# Patient Record
(demographics unavailable — no encounter records)

---

## 2024-11-11 NOTE — REASON FOR VISIT
[FreeTextEntry1] : The patient seen for follow-up of tobacco addiction as well as hypertension.  The patient states checks her blood pressure at home.  On occasion she has systolic blood pressures exceeding 140.  Overall she does not wish to increase pharmacologic therapy at this time.  She has been compliant with amlodipine 10 mg daily.  The patient continues to drink but only 1-2 drinks per day.  She has had elevated transaminases in the past.

## 2024-11-11 NOTE — ASSESSMENT
[FreeTextEntry1] : Tobacco dependence: The patient states she is been helped by nicotine patches in the past.  She is requesting a prescription for nicotine patch.  Prescription for nicotine patch has been issued.  Hyperlipidemia: Given her elevated transaminases in the past and alcohol use we have elected to repeat fasting LFTs cholesterol profile and CPK and then make further decisions with regards to hyperlipidemia treatment.  Hypertension: The patient does not wish to uptitrate pharmacologic therapy this time but will consider it in the future.

## 2025-01-22 NOTE — REASON FOR VISIT
[FreeTextEntry1] : The patient is complaining of palpitations.  The patient was having palpitations for quite some time.  The palpitations were occurring almost every day for a while.  Now they are slightly better.  She is uncertain as to how many times per week they are happening at this time.  There is been no syncope.  There has been no near syncope.  The patient underwent phlebotomy for cholesterol and LFTs but it is not available at this time.  It was ordered by her primary care doctor.

## 2025-01-22 NOTE — ASSESSMENT
[FreeTextEntry1] : Palpitations: 14-day event monitoring has been arranged.  Hyperlipidemia: Repeat LFTs and cholesterol profile are pending.  Patient continues to smoke although she cut back.  The patient continues to drink about 1 drink of alcohol per day.  I have recommended cessation of alcohol and cigarettes.  Elevated transaminases: Repeat LFTs are pending.

## 2025-03-11 NOTE — HISTORY OF PRESENT ILLNESS
[FreeTextEntry1] : Patient is a 78 year old female with PMH of HTN, HLD, palpitations, history of atrial mass s/p resection current tobacco use who presents for follow up. Since her last visit patient has been feeling well. She has started her metoprolol 25mg qD and noticed a significant reduction in her palpitations.  She reports compliance with her other medications and has been adhering to healthier diet/mediterranean diet.  She continues to smoke cigarettes but is willing to quit and has nicotine patches at home.  At our visit, patient picked a "quit date" of 4/1/2025 to stop smoking.  Patient denies significant EtOH use but does admit to drinking 1-2 glasses of wine on occasion.     Prior studies: Labs: 12/9/2025: Lipid Panel: , HDL 78, , TG 74; AST/ALT 55/17; K 4.4, BN/Cr 21/0.84; H/H 14.8/44.6, PLTS 187, TSH 1.71. Holter 1/22/25-2/5/2025: SR, avg HR 82 bpm, 2.3% atrial ectopy, 3791 runs of PSVT, longest run approx 5 mins with avg HR 122bpm associated with symptoms TTE 6/17/2024: Normal LV size and systolic function (LVEF 54.6%), mild MAC, trace AR, trace MR , mild TR, trace PI TTE 7/2023: Normal LV size and systolic function, mod MR, mild TR Carotid US 7/3/2023: No hemodynamically significant stenosis, mild to mod R and mod L sided plaque Holter 3/22/23-4/5/23: SR, <1% PVC and APC burden, 3.2 seconds of AT/SVT TTE 10/8/2022: LVEF 50-55% C 9/28/2022 ([facility]): no evidence of CAD

## 2025-03-11 NOTE — PHYSICAL EXAM
[Well Developed] : well developed [Well Nourished] : well nourished [No Acute Distress] : no acute distress [Normal Conjunctiva] : normal conjunctiva [Normal Venous Pressure] : normal venous pressure [No Carotid Bruit] : no carotid bruit [Clear Lung Fields] : clear lung fields [Good Air Entry] : good air entry [No Respiratory Distress] : no respiratory distress  [Soft] : abdomen soft [Non Tender] : non-tender [Normal Gait] : normal gait [No Edema] : no edema [Moves all extremities] : moves all extremities [No Focal Deficits] : no focal deficits [Normal Speech] : normal speech [Alert and Oriented] : alert and oriented [Normal memory] : normal memory [de-identified] : RRR, S1 and S2 appreciated, no mrg

## 2025-03-11 NOTE — HISTORY OF PRESENT ILLNESS
[FreeTextEntry1] : Patient is a 78 year old female with PMH of HTN, HLD, palpitations, history of atrial mass s/p resection current tobacco use who presents for follow up. Since her last visit patient has been feeling well. She has started her metoprolol 25mg qD and noticed a significant reduction in her palpitations.  She reports compliance with her other medications and has been adhering to healthier diet/mediterranean diet.  She continues to smoke cigarettes but is willing to quit and has nicotine patches at home.  At our visit, patient picked a "quit date" of 4/1/2025 to stop smoking.  Patient denies significant EtOH use but does admit to drinking 1-2 glasses of wine on occasion.     Prior studies: Labs: 12/9/2025: Lipid Panel: , HDL 78, , TG 74; AST/ALT 55/17; K 4.4, BN/Cr 21/0.84; H/H 14.8/44.6, PLTS 187, TSH 1.71. Holter 1/22/25-2/5/2025: SR, avg HR 82 bpm, 2.3% atrial ectopy, 3791 runs of PSVT, longest run approx 5 mins with avg HR 122bpm associated with symptoms TTE 6/17/2024: Normal LV size and systolic function (LVEF 54.6%), mild MAC, trace AR, trace MR , mild TR, trace PI TTE 7/2023: Normal LV size and systolic function, mod MR, mild TR Carotid US 7/3/2023: No hemodynamically significant stenosis, mild to mod R and mod L sided plaque Holter 3/22/23-4/5/23: SR, <1% PVC and APC burden, 3.2 seconds of AT/SVT TTE 10/8/2022: LVEF 50-55% C 9/28/2022 ([facility]): no evidence of CAD

## 2025-03-11 NOTE — PHYSICAL EXAM
[Well Developed] : well developed [Well Nourished] : well nourished [No Acute Distress] : no acute distress [Normal Conjunctiva] : normal conjunctiva [Normal Venous Pressure] : normal venous pressure [No Carotid Bruit] : no carotid bruit [Clear Lung Fields] : clear lung fields [Good Air Entry] : good air entry [No Respiratory Distress] : no respiratory distress  [Soft] : abdomen soft [Non Tender] : non-tender [Normal Gait] : normal gait [No Edema] : no edema [Moves all extremities] : moves all extremities [No Focal Deficits] : no focal deficits [Normal Speech] : normal speech [Alert and Oriented] : alert and oriented [Normal memory] : normal memory [de-identified] : RRR, S1 and S2 appreciated, no mrg

## 2025-03-11 NOTE — DISCUSSION/SUMMARY
[FreeTextEntry1] : 1) Palpitations/SVT - 14 day holter monitor with occasional PACs (2.3%) but with 3791 runs of PSVT (longest 5 mins with avg  bpm, symptomatic) without significant ventricular ectopy - Symptoms have greatly improved with metoprolol 25mg qD, will continue  2) HTN Normotensive at current visit - Continue amlodipine 10mg qD - Continue metoprolol as above  3) HLD - Lipid panel from 12/2024: , HDL 78, , TG 74 - Continue dietary/lifestyle modification, recheck CMP and Lipids in 3 months - If repeat LDL is elevated (goal <100), and transaminases are stable or normalized, will consider re-trial of statin on RTC - Continue ASA 81mg, patient taking QoD  4) Tobacco use - Encouraged smoking cessation.  Patient is wanting to quit, has nicotine patches ready - Identified quit date of 4/1/25

## 2025-03-11 NOTE — ASSESSMENT
[FreeTextEntry1] : 78 year old female with PMH of HTN, HLD, palpitations, current tobacco use who presents for follow up. Patient has been doing well since starting metoprolol 25mg qD, reports significant reduction in symptoms of palpitations, normotensive on exam today.  Patient identified a smoking cessation date of 4/1/2025, she has nicotine patches at home for cessation aide. Of note, prior labs from 12/9/25 with elevated cholesterol levels, elevated AST at 55.  Reports that she previously developed myalgias when trialed on statins in the past.  10 year ASCVD risk estimate elevated at 28% given current risk factors.  After discussing with patient, will continue dietary/lifestyle modification and repeat CMP and Lipids in 3 months. If transaminases are not elevated and LDL remains above goal, will plan to trial statin therapy.

## 2025-06-09 NOTE — PHYSICAL EXAM
[Well Developed] : well developed [Well Nourished] : well nourished [No Acute Distress] : no acute distress [Normal Conjunctiva] : normal conjunctiva [Normal Venous Pressure] : normal venous pressure [No Carotid Bruit] : no carotid bruit [Clear Lung Fields] : clear lung fields [Good Air Entry] : good air entry [No Respiratory Distress] : no respiratory distress  [Soft] : abdomen soft [Non Tender] : non-tender [Normal Gait] : normal gait [No Edema] : no edema [Moves all extremities] : moves all extremities [No Focal Deficits] : no focal deficits [Normal Speech] : normal speech [Alert and Oriented] : alert and oriented [Normal memory] : normal memory [de-identified] : bradycardic, S1 and S2 appreciated, no mrg

## 2025-06-09 NOTE — PHYSICAL EXAM
[Well Developed] : well developed [Well Nourished] : well nourished [No Acute Distress] : no acute distress [Normal Conjunctiva] : normal conjunctiva [Normal Venous Pressure] : normal venous pressure [No Carotid Bruit] : no carotid bruit [Clear Lung Fields] : clear lung fields [Good Air Entry] : good air entry [No Respiratory Distress] : no respiratory distress  [Soft] : abdomen soft [Non Tender] : non-tender [Normal Gait] : normal gait [No Edema] : no edema [Moves all extremities] : moves all extremities [No Focal Deficits] : no focal deficits [Normal Speech] : normal speech [Alert and Oriented] : alert and oriented [Normal memory] : normal memory [de-identified] : bradycardic, S1 and S2 appreciated, no mrg

## 2025-06-09 NOTE — DISCUSSION/SUMMARY
[FreeTextEntry1] : 1) Palpitations/SVT - 14 day holter monitor with occasional PACs (2.3%) but with 3791 runs of PSVT (longest 5 mins with avg  bpm, symptomatic) without significant ventricular ectopy - Symptoms have greatly improved with metoprolol 25mg qD, will continue  2) HTN Normotensive at current visit - Continue amlodipine 10mg qD - Continue metoprolol as above  3) HLD - Lipid panel from 12/2024: , HDL 78, , TG 74 - 6/2025: , HDL 72, , TG 92; AST 52 (55), ALT 15 (15) - Previously tried atorvastatin, stopped due to leg pain - Starting rosuvastatin 10mg qHS - Repeat Lipid Panel in 3 months - Continue ASA 81mg  4) Tobacco use - Encouraged smoking cessation - Prescribed transdermal nicotine smoking cessation aide

## 2025-06-09 NOTE — PHYSICAL EXAM
[Well Developed] : well developed [Well Nourished] : well nourished [No Acute Distress] : no acute distress [Normal Conjunctiva] : normal conjunctiva [Normal Venous Pressure] : normal venous pressure [No Carotid Bruit] : no carotid bruit [Clear Lung Fields] : clear lung fields [Good Air Entry] : good air entry [No Respiratory Distress] : no respiratory distress  [Soft] : abdomen soft [Non Tender] : non-tender [Normal Gait] : normal gait [No Edema] : no edema [Moves all extremities] : moves all extremities [No Focal Deficits] : no focal deficits [Normal Speech] : normal speech [Alert and Oriented] : alert and oriented [Normal memory] : normal memory [de-identified] : bradycardic, S1 and S2 appreciated, no mrg

## 2025-06-09 NOTE — HISTORY OF PRESENT ILLNESS
[FreeTextEntry1] : Patient is a 78 year old female with PMH of HTN, HLD, palpitations, history of atrial mass s/p resection current tobacco use who presents for follow up.  Patient has no acute complaints at present, reports significant improvement with her palpitations after starting metoprolol.  Denies any chest pain or dyspnea, denies any exertional symptoms.  Patient continues to smoke 1ppd and is interested in quitting, she requests transdermal nicotine patch for smoking cessation aide.  Prior studies: Labs 6/6/2025: Lipid Panel: , HDL 72, , TG 92,BUn/Cr 27/0.76, K 5.2; AST 52, ALT 15 Labs: 12/9/2025: Lipid Panel: , HDL 78, , TG 74; AST/ALT 55/17; K 4.4, BN/Cr 21/0.84; H/H 14.8/44.6, PLTS 187, TSH 1.71. Holter 1/22/25-2/5/2025: SR, avg HR 82 bpm, 2.3% atrial ectopy, 3791 runs of PSVT, longest run approx 5 mins with avg HR 122bpm associated with symptoms TTE 6/17/2024: Normal LV size and systolic function (LVEF 54.6%), mild MAC, trace AR, trace MR , mild TR, trace PI TTE 7/2023: Normal LV size and systolic function, mod MR, mild TR Carotid US 7/3/2023: No hemodynamically significant stenosis, mild to mod R and mod L sided plaque Holter 3/22/23-4/5/23: SR, <1% PVC and APC burden, 3.2 seconds of AT/SVT TTE 10/8/2022: LVEF 50-55% C 9/28/2022 ([facility]): no evidence of CAD

## 2025-06-09 NOTE — HISTORY OF PRESENT ILLNESS
[FreeTextEntry1] : Patient is a 78 year old female with PMH of HTN, HLD, palpitations, history of atrial mass s/p resection current tobacco use who presents for follow up.  Patient has no acute complaints at present, reports significant improvement with her palpitations after starting metoprolol.  Denies any chest pain or dyspnea, denies any exertional symptoms.  Patient continues to smoke 1ppd and is interested in quitting, she requests transdermal nicotine patch for smoking cessation aide.  Prior studies: Labs 6/6/2025: Lipid Panel: , HDL 72, , TG 92,BUn/Cr 27/0.76, K 5.2; AST 52, ALT 15 Labs: 12/9/2025: Lipid Panel: , HDL 78, , TG 74; AST/ALT 55/17; K 4.4, BN/Cr 21/0.84; H/H 14.8/44.6, PLTS 187, TSH 1.71. Holter 1/22/25-2/5/2025: SR, avg HR 82 bpm, 2.3% atrial ectopy, 3791 runs of PSVT, longest run approx 5 mins with avg HR 122bpm associated with symptoms TTE 6/17/2024: Normal LV size and systolic function (LVEF 54.6%), mild MAC, trace AR, trace MR , mild TR, trace PI TTE 7/2023: Normal LV size and systolic function, mod MR, mild TR Carotid US 7/3/2023: No hemodynamically significant stenosis, mild to mod R and mod L sided plaque Holter 3/22/23-4/5/23: SR, <1% PVC and APC burden, 3.2 seconds of AT/SVT TTE 10/8/2022: LVEF 50-55% C 9/28/2022 ([facility]): no evidence of CAD

## 2025-06-09 NOTE — ASSESSMENT
[FreeTextEntry1] : 78 year old female with PMH of HTN, HLD, palpitations, current tobacco use who presents for follow up. Patient with no acute complaints, reports improvement in palpitations with metoprolol.  Smoking cessation counselling offered, plan to start transdermal nicotine replacement.  Starting Crestor 10mg qHS, previously intolerant to atorvastatin, plan to recheck lipid panel in 3 months.

## 2025-06-09 NOTE — HISTORY OF PRESENT ILLNESS
[FreeTextEntry1] : Patient is a 78 year old female with PMH of HTN, HLD, palpitations, history of atrial mass s/p resection current tobacco use who presents for follow up.  Patient has no acute complaints at present, reports significant improvement with her palpitations after starting metoprolol.  Denies any chest pain or dyspnea, denies any exertional symptoms.  Patient continues to smoke 1ppd and is interested in quitting, she requests transdermal nicotine patch for smoking cessation aide.  Prior studies: Labs 6/6/2025: Lipid Panel: , HDL 72, , TG 92,BUn/Cr 27/0.76, K 5.2; AST 52, ALT 15 Labs: 12/9/2025: Lipid Panel: , HDL 78, , TG 74; AST/ALT 55/17; K 4.4, BN/Cr 21/0.84; H/H 14.8/44.6, PLTS 187, TSH 1.71. Holter 1/22/25-2/5/2025: SR, avg HR 82 bpm, 2.3% atrial ectopy, 3791 runs of PSVT, longest run approx 5 mins with avg HR 122bpm associated with symptoms TTE 6/17/2024: Normal LV size and systolic function (LVEF 54.6%), mild MAC, trace AR, trace MR , mild TR, trace PI TTE 7/2023: Normal LV size and systolic function, mod MR, mild TR Carotid US 7/3/2023: No hemodynamically significant stenosis, mild to mod R and mod L sided plaque Holter 3/22/23-4/5/23: SR, <1% PVC and APC burden, 3.2 seconds of AT/SVT TTE 10/8/2022: LVEF 50-55% C 9/28/2022 ([facility]): no evidence of CAD